# Patient Record
Sex: MALE
[De-identification: names, ages, dates, MRNs, and addresses within clinical notes are randomized per-mention and may not be internally consistent; named-entity substitution may affect disease eponyms.]

---

## 2017-05-24 ENCOUNTER — CHARTING TRANS (OUTPATIENT)
Dept: OTHER | Age: 27
End: 2017-05-24

## 2017-08-12 ENCOUNTER — OFFICE VISIT (OUTPATIENT)
Dept: FAMILY MEDICINE CLINIC | Facility: CLINIC | Age: 27
End: 2017-08-12

## 2017-08-12 VITALS
WEIGHT: 150 LBS | HEIGHT: 67.5 IN | BODY MASS INDEX: 23.27 KG/M2 | HEART RATE: 75 BPM | TEMPERATURE: 98 F | SYSTOLIC BLOOD PRESSURE: 122 MMHG | DIASTOLIC BLOOD PRESSURE: 80 MMHG

## 2017-08-12 DIAGNOSIS — Z00.00 ROUTINE PHYSICAL EXAMINATION: Primary | ICD-10-CM

## 2017-08-12 DIAGNOSIS — J02.9 PHARYNGITIS, UNSPECIFIED ETIOLOGY: ICD-10-CM

## 2017-08-12 LAB
KIT LOT #: NORMAL NUMERIC
STREP GRP A CUL-SCR: NEGATIVE

## 2017-08-12 PROCEDURE — 87880 STREP A ASSAY W/OPTIC: CPT | Performed by: FAMILY MEDICINE

## 2017-08-12 PROCEDURE — 99395 PREV VISIT EST AGE 18-39: CPT | Performed by: FAMILY MEDICINE

## 2017-08-12 NOTE — PATIENT INSTRUCTIONS
Prevention Guidelines, Men Ages 25 to 44  Screening tests and vaccines are an important part of managing your health. Health counseling is essential, too. Below are guidelines for these, for men ages 25 to 44.  Talk with your healthcare provider to make s Hepatitis B Men at increased risk for infection – talk with your healthcare provider 3 doses over 6 months; second dose should be given 1 month after the first dose; the third dose should be given at least 2 months after the second dose and at least 4 rohan Date Last Reviewed: 3/30/2015  © 1816-4747 [address], [address] [place], [place] [address]. All rights reserved. This information is not intended as a substitute for professional medical care.  Always follow your healthcare professional

## 2017-08-12 NOTE — PROGRESS NOTES
HPI:  32 yr old male who presents for forms completion. Poppy Jewell had physical since 2013. Moving to [place] on Monday. 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 States he thinks he has an updated TdaP. 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   Tries to e Richard ear canals clear. Richard TMs intact with good landmarks noted. Nares patent. Oral mucous membrane moist.  Normal lips, teeth, and gums. Oropharynx erythematous. Tonsils 2+. Neck supple. Good ROM. No LAD.   Thyroid normal.  CV:  Regular rate and rh